# Patient Record
Sex: FEMALE | Race: WHITE
[De-identification: names, ages, dates, MRNs, and addresses within clinical notes are randomized per-mention and may not be internally consistent; named-entity substitution may affect disease eponyms.]

---

## 2017-11-29 ENCOUNTER — HOSPITAL ENCOUNTER (OUTPATIENT)
Dept: HOSPITAL 53 - M WHC | Age: 67
End: 2017-11-29
Attending: NURSE PRACTITIONER
Payer: MEDICARE

## 2017-11-29 DIAGNOSIS — Z12.31: ICD-10-CM

## 2017-11-29 DIAGNOSIS — Z92.29: ICD-10-CM

## 2017-11-29 DIAGNOSIS — Z01.419: Primary | ICD-10-CM

## 2017-11-29 DIAGNOSIS — Z12.12: ICD-10-CM

## 2017-11-29 DIAGNOSIS — Z80.3: ICD-10-CM

## 2017-11-29 DIAGNOSIS — Z92.23: ICD-10-CM

## 2017-11-29 PROCEDURE — 82270 OCCULT BLOOD FECES: CPT

## 2017-11-29 NOTE — REPMRS
Patient History

The patient states she had a clinical breast exam in 11/2017.

Family history of breast cancer in sister at age 58 and breast 

cancer in paternal aunt at age 50 or over.

Took estrogen for 1 year.  Taking unspecified hormones for 2 

years.

 

Digital Woman Screen Mammo: November 29, 2017 - Exam #: 

[id number]

Bilateral CC and MLO view(s) were taken.

 

Technologist: Spring Peters, Technologist

Prior study comparison: November 7, 2016, digital woman screen 

mammo performed at [facility].  November 5, 2015, 

digital woman screen mammo performed at [facility].

November 4, 2014, digital woman screen mammo performed at 

[facility].

 

FINDINGS: There are scattered fibroglandular densities.  There 

has been no change in the appearance of the mammogram from the 

prior studies.  There is a mild amount of scattered 

fibroglandular density which is fairly symmetric. There is no 

interval development of dominant mass, architectural distortion, 

or clustered microcalcification suggestive of malignancy.

 

ASSESSMENT: BI-RADS/ACR category 1 mammogram. Negative.

 

Recommendation

Routine screening mammogram in 1 year (for women over age 40).

This mammogram was interpreted with the aid of an FDA-approved 

computer-aided dectection system.

 

Electronically Signed By: Chaitanya Adame MD 11/29/17 0912

## 2019-04-08 ENCOUNTER — HOSPITAL ENCOUNTER (OUTPATIENT)
Dept: HOSPITAL 53 - M WHC | Age: 69
End: 2019-04-08
Attending: NURSE PRACTITIONER
Payer: MEDICARE

## 2019-04-08 DIAGNOSIS — Z92.29: ICD-10-CM

## 2019-04-08 DIAGNOSIS — Z80.3: ICD-10-CM

## 2019-04-08 DIAGNOSIS — Z12.31: ICD-10-CM

## 2019-04-08 DIAGNOSIS — Z92.23: ICD-10-CM

## 2019-04-08 DIAGNOSIS — Z01.419: Primary | ICD-10-CM

## 2019-04-08 PROCEDURE — 77063 BREAST TOMOSYNTHESIS BI: CPT

## 2019-04-08 PROCEDURE — 77067 SCR MAMMO BI INCL CAD: CPT

## 2019-04-08 NOTE — REPMRS
Patient History

The patient states she had a clinical breast exam in 04/2019.

Family history of breast cancer at age 50 or over in paternal 

aunt, breast cancer at age 58 in sister, endometrial cancer at 

age 50 or over in maternal grandmother.

Took estrogen for 1 year.  Taking unspecified hormones for 3 

years 4 months.

 

Digital Woman Screen Mammo: April 8, 2019 - Exam #: 

[id number]

Bilateral CC and MLO view(s) were taken.

 

Technologist: Spring Peters, Technologist

Prior study comparison: November 29, 2017, digital woman screen 

mammo performed at [facility].  November 7, 2016, digital woman screen mammo performed at [facility].  November 5, 2015, digital woman screen mammo 

performed at [facility].

 

FINDINGS: There are scattered fibroglandular densities.  There 

has been no change in the appearance of the mammogram from the 

prior studies.  There is a mild amount of scattered 

fibroglandular density which is fairly symmetric. There is no 

interval development of dominant mass, architectural distortion, 

or clustered microcalcification suggestive of malignancy.

3-D tomosynthesis shows no additional findings.

 

Assessment: BI-RADS/ACR category 1 mammogram. Negative Mammogram.

 

Recommendation

Routine screening mammogram of both breasts in 1 year (for women 

over age 40).

 

This patient's Lifetime Breast Cancer RIsk is estimated at 12.3 

%.

This mammogram was interpreted with the aid of an FDA-approved 

computer-aided dectection system.

 

Electronically Signed By: Chaitanya Adame MD 04/08/19 5439

## 2020-06-11 ENCOUNTER — HOSPITAL ENCOUNTER (OUTPATIENT)
Dept: HOSPITAL 53 - M WHC | Age: 70
End: 2020-06-11
Attending: NURSE PRACTITIONER
Payer: MEDICARE

## 2020-06-11 DIAGNOSIS — Z80.3: ICD-10-CM

## 2020-06-11 DIAGNOSIS — M85.88: ICD-10-CM

## 2020-06-11 DIAGNOSIS — M85.852: ICD-10-CM

## 2020-06-11 DIAGNOSIS — Z78.0: ICD-10-CM

## 2020-06-11 DIAGNOSIS — M85.851: ICD-10-CM

## 2020-06-11 DIAGNOSIS — Z80.49: ICD-10-CM

## 2020-06-11 DIAGNOSIS — Z12.31: Primary | ICD-10-CM

## 2020-06-11 NOTE — REPMRS
Patient History

The patient states she has not had a clinical breast exam in over

a year.

Family history of breast cancer at age 50 or over in paternal 

aunt, breast cancer at age 58 in sister, endometrial cancer at 

age 50 or over in maternal grandmother.

Took estrogen for 1 year.  Taking unspecified hormones for 3 

years 4 months.

 

3D TOMOSYNTHESIS WAS PERFORMED.

 

[facility] lifetime risk for breast cancer is 11.7%.

 

VOLTIANNAA DENSITY B.

 

Digital Woman Screen Mammo: June 11, 2020 - Exam #: 

[id number]

Bilateral CC and MLO view(s) were taken.

 

Technologist: Stephanie James, Technologist

Prior study comparison: April 8, 2019, bilateral digital woman 

screen mammo performed at [facility] 

[facility].  November 29, 2017, digital woman screen mammo 

performed at [facility].

 

FINDINGS: There are scattered fibroglandular densities.  There 

has been no change in the appearance of the mammogram from the 

prior studies.  There is a mild amount of residual fibroglandular

tissue which is fairly symmetric. There is no interval 

development of dominant mass, architectural distortion, or 

clustered microcalcification suggestive of malignancy.

 

Assessment: BI-RADS/ACR category 1 mammogram. Negative Mammogram.

 

Recommendation

Routine screening mammogram in 1 year (for women over age 40).

This mammogram was interpreted with the aid of an FDA-approved 

computer-aided dectection system.

 

Electronically Signed By: Fredo Cast MD 06/11/20 1668

## 2020-06-18 NOTE — DEXA
AP SPINE   L1 - L4   0.914   -2.3      -0.6

LT FEMUR   TOTAL   0.813   -1.5      -0.2

LT NECK      0.772   -1.9      -0.3

RT FEMUR   TOTAL   0.808   -1.6      -0.2      

RT NECK      0.779   -1.9      -0.2

TOTAL BODY   TOTAL

OTHER



COMMENTS:

There is low bone density of the spine and hips.

The decreased density of the spine does represent significant change.

The decreased density of the left hip does represent a significant change.

The decreased density of the right hip does represent significant change.

The density of the spine has decreased 12.4% since the initial exam on 
07/29/2003.

The decreased 3.3% since the most recent exam on 12/01/2016.

The density of the left hip has decreased 11.7% since the initial exam on 
07/29/2003.

The density of the left hip has decreased 11.1% since the most recent exam on 
12/01/2016.

The density of the right hip has decreased 9.3% since the initial exam on 
07/29/2003.

The density of the right hip has decreased 6.5% since the most recent exam on 
12/01/2016.



FOLLOW-UP:

Recommendation for the next bone density exam: 2 years.



JADE

## 2021-08-05 ENCOUNTER — HOSPITAL ENCOUNTER (OUTPATIENT)
Dept: HOSPITAL 53 - M WHC | Age: 71
End: 2021-08-05
Attending: NURSE PRACTITIONER
Payer: MEDICARE

## 2021-08-05 DIAGNOSIS — Z12.31: ICD-10-CM

## 2021-08-05 DIAGNOSIS — Z92.29: ICD-10-CM

## 2021-08-05 DIAGNOSIS — Z80.3: ICD-10-CM

## 2021-08-05 DIAGNOSIS — Z92.23: ICD-10-CM

## 2021-08-05 DIAGNOSIS — Z01.419: Primary | ICD-10-CM

## 2021-08-05 DIAGNOSIS — Z80.49: ICD-10-CM

## 2021-08-05 PROCEDURE — 77067 SCR MAMMO BI INCL CAD: CPT

## 2021-08-05 PROCEDURE — 77063 BREAST TOMOSYNTHESIS BI: CPT

## 2021-08-05 NOTE — REPMRS
Patient History

The patient states she had a clinical breast exam in July 2021.

Family history of breast cancer at age 50 or over in paternal 

aunt, breast cancer at age 58 in sister, endometrial cancer at 

age 50 or over in maternal grandmother.

Took estrogen for 1 year.  Took unspecified hormones for 3 years 

4 months.

 

Tomosynthesis is performed.

 

Volpara breast density is b.

 

[facility] lifetime risk of breast cancer 10.4%.

Covid vaccines 2/2021 right arm, 3/2021 right arm.

Patient states no breast complaints today. Patient has signed MRS

History Sheet.

 

Digital Woman Screen Mammo: August 5, 2021 - Exam #: 

[id number]

Bilateral CC and MLO view(s) were taken.

 

Technologist: RT Marge

Prior study comparison: June 11, 2020, bilateral digital woman 

screen mammo performed at [facility] 

[facility].  April 8, 2019, bilateral digital woman screen mammo 

performed at [facility].

 

FINDINGS: There are scattered fibroglandular densities.  There 

has been no change in the appearance of the mammogram from the 

prior studies.  There is a mild amount of residual fibroglandular

tissue which is fairly symmetric. There is no interval 

development of dominant mass, architectural distortion, or 

clustered microcalcification suggestive of malignancy.

 

Assessment: BI-RADS/ACR category 1 mammogram. Negative Mammogram.

 

Recommendation

Routine screening mammogram in 1 year (for women over age 40).

This mammogram was interpreted with the aid of an FDA-approved 

computer-aided dectection system.

 

Electronically Signed By: Fredo Cast MD 08/05/21 0933

## 2022-09-06 ENCOUNTER — HOSPITAL ENCOUNTER (OUTPATIENT)
Dept: HOSPITAL 53 - M WHC | Age: 72
End: 2022-09-06
Attending: ADVANCED PRACTICE MIDWIFE
Payer: MEDICARE

## 2022-09-06 DIAGNOSIS — Z12.31: Primary | ICD-10-CM

## 2022-09-23 ENCOUNTER — HOSPITAL ENCOUNTER (OUTPATIENT)
Dept: HOSPITAL 53 - M WHC | Age: 72
End: 2022-09-23
Attending: ADVANCED PRACTICE MIDWIFE
Payer: MEDICARE

## 2022-09-23 DIAGNOSIS — M85.89: ICD-10-CM

## 2022-09-23 DIAGNOSIS — Z13.820: Primary | ICD-10-CM

## 2023-09-12 ENCOUNTER — HOSPITAL ENCOUNTER (OUTPATIENT)
Dept: HOSPITAL 53 - M WHC | Age: 73
End: 2023-09-12
Attending: ADVANCED PRACTICE MIDWIFE
Payer: MEDICARE

## 2023-09-12 DIAGNOSIS — Z12.31: Primary | ICD-10-CM

## 2024-10-01 ENCOUNTER — HOSPITAL ENCOUNTER (OUTPATIENT)
Dept: HOSPITAL 53 - M WHC | Age: 74
End: 2024-10-01
Attending: ADVANCED PRACTICE MIDWIFE
Payer: MEDICARE

## 2024-10-01 DIAGNOSIS — Z87.59: ICD-10-CM

## 2024-10-01 DIAGNOSIS — Z80.3: ICD-10-CM

## 2024-10-01 DIAGNOSIS — Z90.79: ICD-10-CM

## 2024-10-01 DIAGNOSIS — M85.88: ICD-10-CM

## 2024-10-01 DIAGNOSIS — Z01.419: Primary | ICD-10-CM

## 2024-10-01 DIAGNOSIS — Z79.890: ICD-10-CM

## 2024-10-01 DIAGNOSIS — Z13.820: ICD-10-CM

## 2024-10-01 DIAGNOSIS — Z79.51: ICD-10-CM

## 2024-10-01 DIAGNOSIS — Z80.1: ICD-10-CM

## 2024-10-01 DIAGNOSIS — Z12.31: ICD-10-CM

## 2024-10-01 DIAGNOSIS — Z12.39: ICD-10-CM

## 2024-10-01 DIAGNOSIS — Z86.16: ICD-10-CM

## 2024-10-01 DIAGNOSIS — R92.313: ICD-10-CM

## 2024-10-01 DIAGNOSIS — M85.851: ICD-10-CM

## 2024-10-01 DIAGNOSIS — Z90.710: ICD-10-CM

## 2024-10-01 DIAGNOSIS — Z79.899: ICD-10-CM

## 2024-10-01 DIAGNOSIS — M85.852: ICD-10-CM

## 2024-10-01 PROCEDURE — 77063 BREAST TOMOSYNTHESIS BI: CPT

## 2024-10-01 PROCEDURE — 77067 SCR MAMMO BI INCL CAD: CPT

## 2024-10-01 PROCEDURE — 77080 DXA BONE DENSITY AXIAL: CPT
